# Patient Record
Sex: MALE | URBAN - METROPOLITAN AREA
[De-identification: names, ages, dates, MRNs, and addresses within clinical notes are randomized per-mention and may not be internally consistent; named-entity substitution may affect disease eponyms.]

---

## 2017-10-22 ENCOUNTER — EMERGENCY (EMERGENCY)
Facility: HOSPITAL | Age: 27
LOS: 0 days | Discharge: HOME | End: 2017-10-22
Attending: EMERGENCY MEDICINE
Payer: SELF-PAY

## 2017-10-22 VITALS
HEART RATE: 64 BPM | TEMPERATURE: 98 F | WEIGHT: 199.96 LBS | DIASTOLIC BLOOD PRESSURE: 78 MMHG | RESPIRATION RATE: 15 BRPM | SYSTOLIC BLOOD PRESSURE: 131 MMHG | OXYGEN SATURATION: 100 % | HEIGHT: 77 IN

## 2017-10-22 DIAGNOSIS — R21 RASH AND OTHER NONSPECIFIC SKIN ERUPTION: ICD-10-CM

## 2017-10-22 PROCEDURE — 99283 EMERGENCY DEPT VISIT LOW MDM: CPT

## 2017-10-22 RX ORDER — FAMOTIDINE 10 MG/ML
1 INJECTION INTRAVENOUS
Qty: 7 | Refills: 0 | OUTPATIENT
Start: 2017-10-22 | End: 2017-10-29

## 2017-10-22 RX ORDER — FAMOTIDINE 10 MG/ML
20 INJECTION INTRAVENOUS ONCE
Qty: 0 | Refills: 0 | Status: COMPLETED | OUTPATIENT
Start: 2017-10-22 | End: 2017-10-22

## 2017-10-22 RX ORDER — DIPHENHYDRAMINE HCL 50 MG
25 CAPSULE ORAL ONCE
Qty: 0 | Refills: 0 | Status: COMPLETED | OUTPATIENT
Start: 2017-10-22 | End: 2017-10-22

## 2017-10-22 RX ADMIN — Medication 50 MILLIGRAM(S): at 21:50

## 2017-10-22 RX ADMIN — Medication 25 MILLIGRAM(S): at 21:50

## 2017-10-22 RX ADMIN — FAMOTIDINE 20 MILLIGRAM(S): 10 INJECTION INTRAVENOUS at 21:50

## 2017-10-22 NOTE — ED ADULT NURSE NOTE - OBJECTIVE STATEMENT
Patient stated that rash started 3 days ago but has been getting worse today, does not recall to be allergic to anything.  Rash to all over body, denies pain, just itching

## 2017-10-22 NOTE — ED ADULT NURSE NOTE - ADDITIONAL PRINTED INSTRUCTIONS GIVEN
discharged home, instructed to follow up wit dermatologist if not getting better, sooner if worse, verbalized understanding of isntructions

## 2017-10-22 NOTE — ED ADULT TRIAGE NOTE - CHIEF COMPLAINT QUOTE
Pt is here for hives and a rash on his body. Pt has no respiratory difficulty and shortness of breath. Does not feel an itchy or scratchy throat. No known allergies

## 2017-10-22 NOTE — ED PROVIDER NOTE - MEDICAL DECISION MAKING DETAILS
possible allergic rx, will given precautions to return if worsens/doesn't improve, or if it involves airway/mucosa, or dev fever. pt appears well and non-toxic. . VSS. Sx have improved during ED stay. No acute events during ED observation period. Precautions given to return to the ED if sx persist or change. Pt expresses understanding and has no further questions. Pt feels comfortable and wishes to be discharged home. Instructed to follow up with PMD in 24 hrs.

## 2017-10-22 NOTE — ED PROVIDER NOTE - PRESENTING SYMPTOMS DETAILS
27M no pmhx/shx comes ni w a rash starting 1-2 days ago, noted to be pruritis, started int he trunk area, not sure what trigered it, no tongue/lip swelling, no wheezing/cp/sob, no drainage, no fevers  ROS: No fever/chills, No photophobia/eye pain/changes in vision, No ear pain/sore throat/dysphagia, No chest pain/palpitations, no SOB/cough/wheeze/stridor, No abdominal pain/N/V/D, no dysuria/frequency/discharge, No neck/back pain, no changes in neurological status/function.

## 2017-10-22 NOTE — ED PROVIDER NOTE - PHYSICAL EXAMINATION
GEN: Alert, NAD  HEAD: atraumatic, EOMI, PERRL, normal lids/conjunctiva  ENT: normal hearing, patent oropharynx without erythema/exudate, uvula midline  NECK: +supple, no tenderness/meningismus, +Trachea midline  PULM: Bilateral BS, normal resp effort, no wheeze/stridor/retractions  CV: RRR, no M/R/G, +dist ext pulses  ABD: soft, NT/ND  BACK: no CVAT, no midline tenderness  MSK: no edema/erythema/cyanosis  SKIN: no rash, diffuse papular rash, no drainage, pruritis, not involving palms/soles/mucosa  NEURO: AAOx3, no sensory/motor deficits, CN 2-12 intact